# Patient Record
Sex: MALE | Race: WHITE | Employment: FULL TIME | URBAN - METROPOLITAN AREA
[De-identification: names, ages, dates, MRNs, and addresses within clinical notes are randomized per-mention and may not be internally consistent; named-entity substitution may affect disease eponyms.]

---

## 2019-08-20 ENCOUNTER — APPOINTMENT (OUTPATIENT)
Dept: GENERAL RADIOLOGY | Age: 29
End: 2019-08-20
Attending: EMERGENCY MEDICINE
Payer: SELF-PAY

## 2019-08-20 ENCOUNTER — HOSPITAL ENCOUNTER (EMERGENCY)
Age: 29
Discharge: HOME | End: 2019-08-20
Attending: EMERGENCY MEDICINE
Payer: SELF-PAY

## 2019-08-20 VITALS
OXYGEN SATURATION: 99 % | WEIGHT: 176 LBS | HEART RATE: 77 BPM | TEMPERATURE: 97.9 F | SYSTOLIC BLOOD PRESSURE: 146 MMHG | HEIGHT: 68 IN | RESPIRATION RATE: 18 BRPM | DIASTOLIC BLOOD PRESSURE: 86 MMHG | BODY MASS INDEX: 26.67 KG/M2

## 2019-08-20 DIAGNOSIS — S20.211A CONTUSION OF RIGHT CHEST WALL, INITIAL ENCOUNTER: Primary | ICD-10-CM

## 2019-08-20 PROCEDURE — 71101 X-RAY EXAM UNILAT RIBS/CHEST: CPT

## 2019-08-20 PROCEDURE — 74011250637 HC RX REV CODE- 250/637: Performed by: EMERGENCY MEDICINE

## 2019-08-20 PROCEDURE — 99283 EMERGENCY DEPT VISIT LOW MDM: CPT

## 2019-08-20 RX ORDER — IBUPROFEN 800 MG/1
800 TABLET ORAL
Qty: 30 TAB | Refills: 0 | Status: SHIPPED | OUTPATIENT
Start: 2019-08-20

## 2019-08-20 RX ORDER — IBUPROFEN 400 MG/1
800 TABLET ORAL ONCE
Status: COMPLETED | OUTPATIENT
Start: 2019-08-20 | End: 2019-08-20

## 2019-08-20 RX ADMIN — IBUPROFEN 800 MG: 400 TABLET, FILM COATED ORAL at 00:53

## 2019-08-20 NOTE — ED PROVIDER NOTES
EMERGENCY DEPARTMENT HISTORY AND PHYSICAL EXAM      Date: 8/20/2019  Patient Name: Jason Cunningham    History of Presenting Illness     Chief Complaint   Patient presents with    Rib Pain       History Provided By: Patient    HPI: Jason Cunningham, 33 y.o. male with PMHx significant for right chest wall injury 2 weeks ago after falling out of bed with no medical evaluation, presents by private vehicle to the ED with cc of right chest wall pain. This is a 69-year-old male who fell from his bed 2 weeks ago injuring his right chest.  He had no x-rays or evaluation done then. 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 He is here for fear of a broken rib that may have changed position. He has no other symptoms. There are no other complaints, changes, or physical findings at this time. PCP: None    Current Outpatient Medications   Medication Sig Dispense Refill    ibuprofen (MOTRIN) 800 mg tablet Take 1 Tab by mouth every eight (8) hours as needed for Pain. 30 Tab 0       Past History     Past Medical History:  History reviewed. No pertinent past medical history. Past Surgical History:  History reviewed. No pertinent surgical history. Family History:  History reviewed. No pertinent family history. Social History:  Social History     Tobacco Use    Smoking status: Current Every Day Smoker     Packs/day: 1.00    Smokeless tobacco: Never Used   Substance Use Topics    Alcohol use: Yes     Alcohol/week: 24.0 standard drinks     Types: 24 Cans of beer per week    Drug use: Not on file       Allergies:  No Known Allergies      Review of Systems   Review of Systems   Constitutional: Negative for chills and fever. HENT: Negative for congestion, rhinorrhea, sneezing and sore throat. Eyes: Negative for redness and visual disturbance. Respiratory: Negative for shortness of breath. Cardiovascular: Positive for chest pain. Negative for leg swelling. Gastrointestinal: Negative for abdominal pain, nausea and vomiting. Genitourinary: Negative for difficulty urinating and frequency. Musculoskeletal: Negative for back pain, myalgias and neck stiffness. Skin: Negative for rash. Neurological: Negative for dizziness, syncope, weakness and headaches. Hematological: Negative for adenopathy. All other systems reviewed and are negative. Physical Exam   Physical Exam   Constitutional: He is oriented to person, place, and time. He appears well-developed and well-nourished. HENT:   Head: Normocephalic and atraumatic. Mouth/Throat: Oropharynx is clear and moist and mucous membranes are normal.   Eyes: EOM are normal.   Neck: Normal range of motion and full passive range of motion without pain. Neck supple. Cardiovascular: Normal rate, regular rhythm, normal heart sounds, intact distal pulses and normal pulses. No murmur heard. Pulmonary/Chest: Effort normal and breath sounds normal. No respiratory distress. He exhibits no tenderness. Abdominal: Soft. Normal appearance and bowel sounds are normal. There is no tenderness. There is no rebound and no guarding. Musculoskeletal:   Tenderness of the right anterior upper chest wall. Neurological: He is alert and oriented to person, place, and time. He has normal strength. Skin: Skin is warm, dry and intact. No rash noted. No erythema. Psychiatric: He has a normal mood and affect. His speech is normal and behavior is normal. Judgment and thought content normal.   Nursing note and vitals reviewed. Diagnostic Study Results     Labs -   No results found for this or any previous visit (from the past 12 hour(s)).     Radiologic Studies -   XR RIBS RT W PA CXR MIN 3 V   Final Result   IMPRESSION: No acute rib fracture or other acute abnormality in the chest.            CT Results  (Last 48 hours)    None        CXR Results  (Last 48 hours)    None            Medical Decision Making   I am the first provider for this patient. I reviewed the vital signs, available nursing notes, past medical history, past surgical history, family history and social history. Vital Signs-Reviewed the patient's vital signs. No data found. Records Reviewed: Nursing Notes    Provider Notes (Medical Decision Making):   Rib fracture versus chest contusion    ED Course:   Initial assessment performed. The patients presenting problems have been discussed, and they are in agreement with the care plan formulated and outlined with them. I have encouraged them to ask questions as they arise throughout their visit. X-ray is negative for fracture or pneumothorax. Disposition:  Patient informed of results of workup and is comfortable with discharge to home to follow up with PCP. They are instructed to return as needed for worsening condition. PLAN:  1. Discharge Medication List as of 8/20/2019  1:18 AM      START taking these medications    Details   ibuprofen (MOTRIN) 800 mg tablet Take 1 Tab by mouth every eight (8) hours as needed for Pain., Print, Disp-30 Tab, R-0           2. Follow-up Information     Follow up With Specialties Details Why [address] [facility] EMERGENCY DEPT Emergency Medicine  As needed, If symptoms worsen [address]    [facility]  Schedule an appointment as soon as possible for a visit  [address]  [phone]        Return to ED if worse     Diagnosis     Clinical Impression:   1.  Contusion of right chest wall, initial encounter

## 2019-08-20 NOTE — ED TRIAGE NOTES
C/o right upper rib pain x 2 weeks after rolling out of his bed and landing on his nightstand. 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

## 2019-08-20 NOTE — DISCHARGE INSTRUCTIONS
Patient Education        Chest Contusion: Care Instructions  Your Care Instructions  A chest contusion, or bruise, is caused by a fall or direct blow to the chest. Car crashes, falls, getting punched, and injury from bicycle handlebars are common causes of chest contusions. A very forceful blow to the chest can injure the heart or blood vessels in the chest, the lungs, the airway, the liver, or the spleen. Pain may be caused by an injury to muscles, cartilage, or ribs. Deep breathing, coughing, or sneezing can increase your pain. Lying on the injured area also can cause pain. Follow-up care is a key part of your treatment and safety. Be sure to make and go to all appointments, and call your doctor if you are having problems. It's also a good idea to know your test results and keep a list of the medicines you take. How can you care for yourself at home? · Rest and protect the injured or sore area. Stop, change, or take a break from any activity that may be causing your pain. · Put ice or a cold pack on the area for 10 to 20 minutes at a time. Put a thin cloth between the ice and your skin. · After 2 or 3 days, if your swelling is gone, apply a heating pad set on low or a warm cloth to your chest. Some doctors suggest that you go back and forth between hot and cold. Put a thin cloth between the heating pad and your skin. · Do not wrap or tape your ribs for support. This may cause you to take smaller breaths, which could increase your risk of pneumonia and lung collapse. · Ask your doctor if you can take an over-the-counter pain medicine, such as acetaminophen (Tylenol), ibuprofen (Advil, Motrin), or naproxen (Aleve). Be safe with medicines. Read and follow all instructions on the label. · Take your medicines exactly as prescribed. Call your doctor if you think you are having a problem with your medicine.   · Gentle stretching and massage may help you feel better after a few days of rest. Stretch slowly to the point just before discomfort begins, then hold the stretch for at least 15 to 30 seconds. Do this 3 or 4 times per day. · As your pain gets better, slowly return to your normal activities. Be patient, because chest bruises can take weeks or months to heal. Any increased pain may be a sign that you need to rest a while longer. When should you call for help? Call 911 anytime you think you may need emergency care. For example, call if:    · You have severe trouble breathing.     · You cough up blood.    Call your doctor now or seek immediate medical care if:    · You have belly pain.     · You are dizzy or lightheaded, or you feel like you may faint.     · You develop new symptoms with the chest pain.     · Your chest pain gets worse.     · You have a fever.     · You have some shortness of breath.     · You have a cough that brings up mucus from the lungs.    Watch closely for changes in your health, and be sure to contact your doctor if:    · Your chest pain is not improving after 1 week. Where can you learn more? Go to http://tiki-milla.info/. Enter I174 in the search box to learn more about \"Chest Contusion: Care Instructions. \"  Current as of: September 23, 2018  Content Version: 12.1  © 3354-8891 [employer]. Care instructions adapted under license by [employer] (which disclaims liability or warranty for this information). If you have questions about a medical condition or this instruction, always ask your healthcare professional. [place] [address] any warranty or liability for your use of this information. Patient Education        Bruised Rib: Care Instructions  Overview    You can get a bruised rib if you fall or get hit, such as in an accident or while playing sports. The medical term for a bruise is \"contusion. \" Small blood vessels get torn and leak blood under the skin. Most people think of a bruise as a black-and-blue area.  But bones and muscles can also get bruised. An injury may damage the rib but not cause a bruise that you can see. Sometimes it can be hard to tell if a rib is bruised or broken. The symptoms may be the same. And a broken bone can't always be seen on an X-ray. But the treatment for a bruised rib is often the same as treatment for a broken one. An injury to the ribs can cause pain. The pain may be worse when you breathe deeply, cough, or sneeze. In most cases, a bruised rib will heal on its own. You can take pain medicine while the rib mends. Pain relief allows you to take deep breaths. Follow-up care is a key part of your treatment and safety. Be sure to make and go to all appointments, and call your doctor if you are having problems. It's also a good idea to know your test results and keep a list of the medicines you take. How can you care for yourself at home? · Rest and protect the injured or sore area. Stop, change, or take a break from any activity that causes pain. · Put ice or a cold pack on the area for 10 to 20 minutes at a time. Put a thin cloth between the ice and your skin. · After 2 or 3 days, if your swelling is gone, put a heating pad set on low or a warm cloth on your chest. Some doctors suggest that you go back and forth between hot and cold. Put a thin cloth between the heating pad and your skin. · Ask your doctor if you can take an over-the-counter pain medicine, such as acetaminophen (Tylenol), ibuprofen (Advil, Motrin), or naproxen (Aleve). Be safe with medicines. Read and follow all instructions on the label. · As your pain gets better, slowly return to your normal activities. Be patient. Rib bruises can take weeks or months to heal. If the pain gets worse, it may be a sign that you need to rest a while longer. When should you call for help? [id number] anytime you think you may need emergency care.  For example, call if:    · You have severe trouble breathing.     Call your doctor now or seek immediate medical care if:    · You have trouble breathing.     · You have a fever.     · You have a new or worse cough.     · You have new or worse pain.    Watch closely for changes in your health, and be sure to contact your doctor if:    · You do not get better as expected. Where can you learn more? Go to http://tiki-milla.info/. Enter R125 in the search box to learn more about \"Bruised Rib: Care Instructions. \"  Current as of: September 23, 2018  Content Version: 12.1  © 8402-5148 Healthwise, Incorporated. Care instructions adapted under license by [employer] (which disclaims liability or warranty for this information). If you have questions about a medical condition or this instruction, always ask your healthcare professional. [address] any warranty or liability for your use of this information.

## 2019-08-20 NOTE — ED NOTES
Discharge instructions were given to the patient by Paul Adam RN. The patient left the Emergency Department ambulatory, alert and oriented and in no acute distress with 1 prescriptions. The patient was encouraged to call or return to the ED for worsening issues or problems and was encouraged to schedule a follow up appointment for continuing care. The patient verbalized understanding of discharge instructions and prescriptions, all questions were answered. The patient has no further concerns at this time.

## 2019-08-20 NOTE — ED NOTES
Pt presents ambulatory to ED complaining of R upper rib cage pain and RUE x 2 weeks post falling off his bed onto his night stand. 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 Pt is alert and oriented x 4, RR even and unlabored, skin is warm and dry. Assesment completed and pt updated on plan of care. Emergency Department Nursing Plan of Care       The Nursing Plan of Care is developed from the Nursing assessment and Emergency Department Attending provider initial evaluation. The plan of care may be reviewed in the ED Provider note.     The Plan of Care was developed with the following considerations:   Patient / Family readiness to learn indicated by:verbalized understanding  Persons(s) to be included in education: patient  Barriers to Learning/Limitations:No    Signed     Brodie Palmer RN    8/20/2019   1:21 AM